# Patient Record
(demographics unavailable — no encounter records)

---

## 2024-10-18 NOTE — HISTORY OF PRESENT ILLNESS
[0] : currently ~his/her~ pain is 0 out of 10 [FreeTextEntry1] : 15 yo male s/p left shoulder arthroscopy with anterior labral repair and capsulorrhaphy on 7/12/24. He has been doing PT following protocol. He is doing well. No complaints of pain. No episodes of instability. He is here for routine f/u.

## 2024-10-18 NOTE — ASSESSMENT
[FreeTextEntry1] : s/p left shoulder arthroscopy with anterior labral repair and capsulorrhapy 7/12/24  The history for today's visit was obtained from the child, as well as the parent. The child's history was unreliable alone due to age and therefore, the parent was used today as an independent historian. He is doing well. He will continue PT following protocol. When PT stops, he should continue a home program.  No gym or sports stressed to patient.  He will f/u in 2 months for check and we will consider slow reintroduction to sport All questions answered. Parent in agreement with the plan. Laure AGUILERA, GEORGINA, PAC, have acted as a scribe and documented the above for Dr. Pimentel.  The above documentation completed by the scribe is an accurate record of both my words and actions.  JACQUELINE

## 2024-10-18 NOTE — PHYSICAL EXAM
[FreeTextEntry1] : GAIT: No limp. Good coordination and balance noted GENERAL: alert, cooperative pleasant young 15 yo male in NAD SKIN: The skin is intact, warm, pink and dry over the area examined. EYES: Normal conjunctiva, normal eyelids and pupils were equal and round. ENT: normal ears, normal nose and normal lips. CARDIOVASCULAR: brisk capillary refill, but no peripheral edema. RESPIRATORY: The patient is in no apparent respiratory distress. They're taking full deep breaths without use of accessory muscles or evidence of audible wheezes or stridor without the use of a stethoscope. Normal respiratory effort. ABDOMEN: not examined   left shoulder: full elevation noted. ER at side 50 degrees, right 70 degrees.  At 90 degrees, 10 degree off of neutral to neutral, right 15 beyond neutral.  incisions well healed.  No tenderness distal motor 5/5 sensation grossly intact

## 2024-10-18 NOTE — REVIEW OF SYSTEMS
[Change in Activity] : change in activity [Rash] : no rash [Congestion] : no congestion [Joint Pains] : no arthralgias [Joint Swelling] : no joint swelling

## 2024-10-18 NOTE — REASON FOR VISIT
[Follow Up] : a follow up visit [Family Member] : family member [Patient] : patient [FreeTextEntry1] : 7/12/24: left shoulder arthroscopy with anterior labral repair and casulorrhaphy

## 2024-12-20 NOTE — ASSESSMENT
[FreeTextEntry1] : This young man comes today for his operatively treated left shoulder, which underwent anterior stabilization capsulorrhaphy, now approaching six months postop.   INTERVAL HISTORY:  Darrion comes today accompanied by his grandmother.  He has been doing well.  He has been completely asymptomatic and is anxious to return to competitive baseball.  The patient has had no recurrent episodes of instability.  He has attended [facility] Therapy Services and at this point has been more or less transitioned to follow up as needed given the fact that he has met all postoperative goals.  The patient comes today for possible clearance.   Since the day of the last evaluation, there has been no significant change in past medical or social history.   Review of systems today is negative for fever, chills, chest pain, shortness of breath, or rashes.   PHYSICAL EXAMINATION: On physical examination today, Darrion is in no apparent distress.  He is pleasant, cooperative and alert, appropriate for age.  Focused examination of the shoulder demonstrates normal clinical alignment.  No evidence of periscapular atrophy.  The patient has 5/5 biceps, triceps and deltoid strength.  He has a negative anterior and posterior loading shift.  The patient comes to 45 degrees of external rotation with the shoulder at the side.  It is just off the contralateral side, which is at about 60 to 70 degrees.  In the 90-90 abducted position, the patient has approximately neutral external rotation.  He has a negative Vermillion test and has negative Neer and Maynard impingement tests as well as healed surgical incisions.   ASSESSMENT/PLAN: Darrion is a 15-year-old young man who he is a competitive .  The patient has undergone surgical management regarding recurrent instability of his left shoulder, which currently has some diminished external rotation, which is not concerning given the fact that he is right-hand dominant.  The patient feels that he is adequately rehabilitated and he has had no instability events.  He has allowed adequate time for healing of this issue.  As such, based on his strength and motion as well as six months postoperative rehabilitation, I feel comfortable in allowing him to resume his full physical activities including competitive baseball.  I have advised on modifying certain techniques, particularly with sliding to avoid redislocation despite the operative procedure performed.  All questions were answered to satisfaction today.  A release note was provided.  Darrion's grandmother, who act as independent historian today given the child's pediatric age, expressed understanding and agrees.

## 2024-12-20 NOTE — ASSESSMENT
[FreeTextEntry1] : This young man comes today for his operatively treated left shoulder, which underwent anterior stabilization capsulorrhaphy, now approaching six months postop.   INTERVAL HISTORY:  Darrion comes today accompanied by his grandmother.  He has been doing well.  He has been completely asymptomatic and is anxious to return to competitive baseball.  The patient has had no recurrent episodes of instability.  He has attended Physical Therapy Services and at this point has been more or less transitioned to follow up as needed given the fact that he has met all postoperative goals.  The patient comes today for possible clearance.   Since the day of the last evaluation, there has been no significant change in past medical or social history.   Review of systems today is negative for fever, chills, chest pain, shortness of breath, or rashes.   PHYSICAL EXAMINATION: On physical examination today, Darrion is in no apparent distress.  He is pleasant, cooperative and alert, appropriate for age.  Focused examination of the shoulder demonstrates normal clinical alignment.  No evidence of periscapular atrophy.  The patient has 5/5 biceps, triceps and deltoid strength.  He has a negative anterior and posterior loading shift.  The patient comes to 45 degrees of external rotation with the shoulder at the side.  It is just off the contralateral side, which is at about 60 to 70 degrees.  In the 90-90 abducted position, the patient has approximately neutral external rotation.  He has a negative [place] test and has negative Neer and Maynard impingement tests as well as healed surgical incisions.   ASSESSMENT/PLAN: Darrion is a 15-year-old young man who he is a competitive .  The patient has undergone surgical management regarding recurrent instability of his left shoulder, which currently has some diminished external rotation, which is not concerning given the fact that he is right-hand dominant.  The patient feels that he is adequately rehabilitated and he has had no instability events.  He has allowed adequate time for healing of this issue.  As such, based on his strength and motion as well as six months postoperative rehabilitation, I feel comfortable in allowing him to resume his full physical activities including competitive baseball.  I have advised on modifying certain techniques, particularly with sliding to avoid redislocation despite the operative procedure performed.  All questions were answered to satisfaction today.  A release note was provided.  Darrion's grandmother, who act as independent historian today given the child's pediatric age, expressed understanding and agrees.

## 2025-03-11 NOTE — HISTORY OF PRESENT ILLNESS
[de-identified] : recurrent sinus infections  [FreeTextEntry6] : states that  has been getting recurrent sinus infections and states that has been seeing allergist who feels needs another dose of prevanar due to low antibodies on testing as well

## 2025-03-11 NOTE — DISCUSSION/SUMMARY
[FreeTextEntry1] : give prevnar repeat titer testing in next 3 weeks   [] : The components of the vaccine(s) to be administered today are listed in the plan of care. The disease(s) for which the vaccine(s) are intended to prevent and the risks have been discussed with the caretaker.  The risks are also included in the appropriate vaccination information statements which have been provided to the patient's caregiver.  The caregiver has given consent to vaccinate.

## 2025-04-15 NOTE — HISTORY OF PRESENT ILLNESS
[EENT/Resp Symptoms] : EENT/RESPIRATORY SYMPTOMS [Nasal congestion] : nasal congestion [Wet cough] : wet cough [Nasal Congestion] : nasal congestion [Sore Throat] : sore throat [Cough] : cough [Recent swimming] : no recent swimming [Known Exposure to COVID-19] : no known exposure to COVID-19 [History of recent COVID-19 infection] : no history of recent COVID-19 infection [Sick Contacts: ___] : no sick contacts [Fever] : no fever [Decreased Appetite] : no decreased appetite [de-identified] : thraot pain and sinus congestion  [FreeTextEntry6] : worsening of pain when swallowing overnight  decrease oral intake  and headaches as well and sinus pressure

## 2025-04-15 NOTE — PHYSICAL EXAM
[Clear Rhinorrhea] : clear rhinorrhea [Mucoid Discharge] : mucoid discharge [Hypertrophied Nasal Mucosa] : hypertrophied nasal mucosa [Erythematous Oropharynx] : erythematous oropharynx [NL] : warm, clear

## 2025-04-15 NOTE — DISCUSSION/SUMMARY
[FreeTextEntry1] : start antibiotics if fever persists for more than 48 hours to call backs and if there is difficulty in swallowing consider starting the prednisolone Symptoms likely due to viral URI. Recommend supportive care including antipyretics, fluids, and nasal saline followed by nasal suction. Return if symptoms worsen or persist.

## 2025-04-15 NOTE — HISTORY OF PRESENT ILLNESS
[EENT/Resp Symptoms] : EENT/RESPIRATORY SYMPTOMS [Nasal congestion] : nasal congestion [Wet cough] : wet cough [Nasal Congestion] : nasal congestion [Sore Throat] : sore throat [Cough] : cough [Recent swimming] : no recent swimming [Known Exposure to COVID-19] : no known exposure to COVID-19 [History of recent COVID-19 infection] : no history of recent COVID-19 infection [Sick Contacts: ___] : no sick contacts [Fever] : no fever [Decreased Appetite] : no decreased appetite [de-identified] : thraot pain and sinus congestion  [FreeTextEntry6] : worsening of pain when swallowing overnight  decrease oral intake  and headaches as well and sinus pressure

## 2025-07-15 NOTE — HISTORY OF PRESENT ILLNESS
[FreeTextEntry6] : pt has throat pain and fever since yesterday recently travel for sports no one sick at home

## 2025-07-15 NOTE — DISCUSSION/SUMMARY
[FreeTextEntry1] : Rapid strep: Negative, Culture sent based on exam, will start abx while culture is pending  Warm salt water gargles Nasal Saline spray every 4 hours for nasal congestion  Return to the office for worsening symptoms